# Patient Record
Sex: MALE | Race: WHITE | Employment: OTHER | URBAN - METROPOLITAN AREA
[De-identification: names, ages, dates, MRNs, and addresses within clinical notes are randomized per-mention and may not be internally consistent; named-entity substitution may affect disease eponyms.]

---

## 2020-10-22 ENCOUNTER — TELEPHONE (OUTPATIENT)
Dept: CARDIOLOGY | Facility: CLINIC | Age: 60
End: 2020-10-22

## 2020-10-22 ENCOUNTER — TRANSFERRED RECORDS (OUTPATIENT)
Dept: HEALTH INFORMATION MANAGEMENT | Facility: CLINIC | Age: 60
End: 2020-10-22

## 2020-10-22 NOTE — TELEPHONE ENCOUNTER
[facility] Call Center    Phone Message    May a detailed message be left on voicemail: yes     Reason for Call: Patient called to schedule with cardiologist for A-Fib. He has referral from his primary physician in hand. Scheduled him for 1/4/2021 with Dr. Mendez and put on wait list. Patient is hoping he can get in sooner so he doesn't need to go to ED as he is experiencing tachycardia that keeps him up at night. Patient states he lives very close and can come whenever we can get him in. Please advise. Thank you.    Action Taken: Message routed to:  Adult Clinics: Cardiology p [id number]    Travel Screening: Not Applicable

## 2020-10-23 ENCOUNTER — MEDICAL CORRESPONDENCE (OUTPATIENT)
Dept: HEALTH INFORMATION MANAGEMENT | Facility: CLINIC | Age: 60
End: 2020-10-23

## 2020-10-23 NOTE — TELEPHONE ENCOUNTER
[facility] Call Center    Phone Message    May a detailed message be left on voicemail: yes     Reason for Call: Patient returned call. Please call back. Thank you.    Action Taken: Message routed to:  Adult Clinics: Cardiology p [id number]    Travel Screening: Not Applicable

## 2020-10-23 NOTE — TELEPHONE ENCOUNTER
Called and left message for patient. Asked him to call back regarding records. Need to know when records could be received , nothing in Care Everywhere.     DBalcsam, RN

## 2020-10-26 ENCOUNTER — TELEPHONE (OUTPATIENT)
Dept: CARDIOLOGY | Facility: CLINIC | Age: 60
End: 2020-10-26

## 2020-10-26 ENCOUNTER — MEDICAL CORRESPONDENCE (OUTPATIENT)
Dept: HEALTH INFORMATION MANAGEMENT | Facility: CLINIC | Age: 60
End: 2020-10-26

## 2020-10-26 NOTE — TELEPHONE ENCOUNTER
Patient called back. He PCP , Dr Vargas is referring to the [facility] for afib episodes. He had an ablation about 5 years ago at [facility] and didn't have any subsequent episodes after until recently. He is now having episodes daily, usually when he sits or tries to sleep. They keep him up at night. He is very tired and weak and not sleeping well at all. He is on Xarelto 20 mg daily.   Unable to access records from Care Everywhere. He will come and sign a release for Care Everywhere. He is hoping for an earlier appt than what he has scheduled. Will review records and call him back.   TANNA David

## 2020-10-27 ENCOUNTER — TRANSCRIBE ORDERS (OUTPATIENT)
Dept: OTHER | Age: 60
End: 2020-10-27

## 2020-10-27 DIAGNOSIS — I48.91 ATRIAL FIBRILLATION WITH RVR (H): Primary | ICD-10-CM

## 2020-10-28 NOTE — TELEPHONE ENCOUNTER
Records received from referral specialist and reviewed. Patient was seen on 10/22/20 by his PCP, Dr Vargas. He c/o recurrent palpitations, with pulse up to 150-220 bpm. He has been able to bring his pulse down in the past but lately is is becoming more difficult to do this. He had a right atrial flutter ablation at [facility] by Dr Reilly on  4/5/16.He does not want to return to [facility] for cardiology follow up.     Consulted with Dr Del Valle. Recommended he see Dr Wetzel at the [facility]. Will have referral records scanned into the chart. Patient will call scheduling at the  ([facility]) and arrange an appt.    TANNA David

## 2020-10-29 NOTE — TELEPHONE ENCOUNTER
RECORDS RECEIVED FROM:   DATE RECEIVED:   NOTES STATUS DETAILS   OFFICE NOTE from referring provider    Received 10-23-20 from [facility]   OFFICE NOTE from other cardiologist    N/A    DISCHARGE SUMMARY from hospital    N/A    DISCHARGE REPORT from the ER   N/A    OPERATIVE REPORT    N/A    MEDICATION LIST   N/A    LABS     BMP   N/A    CBC   N/A    CMP   N/A    Lipids   N/A    TSH   N/A    DIAGNOSTIC PROCEDURES     EKG   In process 10-22-20   Monitor Reports   N/A    IMAGING (DISC & REPORT)      Echo   N/A    Stress Tests   N/A    Cath   N/A    MRI/MRA   N/A    CT/CTA   In process 11-19-19     Action    Action Taken 10-29: requested CT chest 11-19-19 from [facility]  10-29: requested [facility] resend EKG from 10-22-20 to see if we can get a better quality one  11-4: Received EKG, not better quality. Resolved CT Chest from [facility].

## 2020-11-03 ENCOUNTER — DOCUMENTATION ONLY (OUTPATIENT)
Dept: CARE COORDINATION | Facility: CLINIC | Age: 60
End: 2020-11-03

## 2020-11-06 NOTE — PROGRESS NOTES
"Darryn Guajardo is a 60 year old male who is being evaluated via a billable video visit.      The patient has been notified of following:     \"This video visit will be conducted via a call between you and your physician/provider. We have found that certain health care needs can be provided without the need for an in-person physical exam.  This service lets us provide the care you need with a video conversation.  If a prescription is necessary we can send it directly to your pharmacy.  If lab work is needed we can place an order for that and you can then stop by our lab to have the test done at a later time.    Video visits are billed at different rates depending on your insurance coverage.  Please reach out to your insurance provider with any questions.    If during the course of the call the physician/provider feels a video visit is not appropriate, you will not be charged for this service.\"    Patient has given verbal consent for Video visit? Yes  How would you like to obtain your AVS? [employer]  If you are dropped from the video visit, the video invite should be resent to: Text to cell phone: [phone]   Will anyone else be joining your video visit? No         Video-Visit Details    Type of service:  Video Visit    Video Start Time: 9:02 AM  Video End Time: 10:01 AM    Originating Location (pt. Location): Home    Distant Location (provider location):  [facility]     Platform used for Video Visit: Sanna Wetzel MD    Pt will be connecting on [employer] @8:45AM      HPI:  Mr. Guajardo is 61 yo Male with a PMH of AF/AFL s/p DCCV x 2, CTI ABL at [facility] by Dr Reilly on 4/5/2016, PSVT in 2016, and positive stress 2016 --> negative CORS.  He was referred to the [facility] by Dr. Vargas for frequent PAF.  He had done well since the AFL ABL until 8/2020 when he started having palpitation episodes. He was found to have AF at Dr. Vargas's office.  He is now having AF lasting for " several hours every day.  He stated that stress and fatigue might be a trigger of AF.  He denied chest pian, SOB or dizziness.  He is now Xarelto 20 mg daily.      PAST MEDICAL HISTORY:  Past Medical History:   Diagnosis Date     Lyme disease         CURRENT MEDICATIONS:  Current Outpatient Medications   Medication Sig Dispense Refill     cetirizine (ZYRTEC) 10 MG tablet 1 tablet as needed       rivaroxaban ANTICOAGULANT (XARELTO) 20 MG TABS tablet 1 tablet with food         PAST SURGICAL HISTORY:  Past Surgical History:   Procedure Laterality Date     HERNIA REPAIR         ALLERGIES:     Allergies   Allergen Reactions     Beer Unknown     Ciprofloxacin Unknown     Penicillin G        FAMILY HISTORY:  - Premature coronary artery disease  + Atrial fibrillation  - Sudden cardiac death   Father: mitral valve replacement.    SOCIAL HISTORY:  Social History     Tobacco Use     Smoking status: Former Smoker     Types: Cigarettes     Smokeless tobacco: Never Used   Substance Use Topics     Alcohol use: Yes     Comment: Social     Drug use: Never       ROS:   12 points of ROS were reviewed.  Constitutional: No fever, chills, or sweats. Weight stable.   ENT: No visual disturbance, ear ache, epistaxis, sore throat.   Cardiovascular: As per HPI.   Respiratory: No cough, hemoptysis.    GI: No nausea, vomiting, hematemesis, melena, or hematochezia.   : No hematuria.   Integument: Negative.   Psychiatric: Negative.   Hematologic:  Easy bruising, no easy bleeding.  Neuro: Negative.   Endocrinology: No significant heat or cold intolerance   Musculoskeletal: No myalgia.    Exam:  There were no vitals taken for this visit.  GENERAL APPEARANCE: healthy, alert and no distress  HEENT: no icterus, no xanthelasmas, normal pupil size and reaction, normal palate, mucosa moist, no central cyanosis  NECK: no adenopathy, no asymmetry, masses, or scars, thyroid normal to palpation and no bruits, JVP not elevated  RESPIRATORY: lungs clear to  auscultation - no rales, rhonchi or wheezes, no use of accessory muscles, no retractions, respirations are unlabored, normal respiratory rate  CARDIOVASCULAR: regular rhythm, normal S1 with physiologic split S2, no S3 or S4 and no murmur, click or rub, precordium quiet with normal PMI.  ABDOMEN: soft, non tender, without hepatosplenomegaly, no masses palpable, bowel sounds normal, aorta not enlarged by palpation, no abdominal bruits  EXTREMITIES: peripheral pulses normal, no edema, no bruits  NEURO: alert and oriented to person/place/time, normal speech, gait and affect  VASC: Radial, femoral, dorsalis pedis and posterior tibialis pulses are normal in volumes and symmetric bilaterally. No bruits are heard.  SKIN: no ecchymoses, no rashes    Labs:  CBC RESULTS:   No results found for: WBC, RBC, HGB, HCT, MCV, MCH, MCHC, RDW, PLT    BMP RESULTS:  No results found for: NA, POTASSIUM, CHLORIDE, CO2, ANIONGAP, GLC, BUN, CR, GFRESTIMATED, GFRESTBLACK, WANDA     INR RESULTS:  No results found for: INR    Procedures:      Assessment and Plan:  #1 AFL s/p DCCV x 2, CTI ABL at [facility] by Dr Reilly on 4/5/2016  #2 PSVT in 2016  #3 Positive stress 2016 --> negative CORS.  #4 PAF  #5 CHADS2-Vasc score = 0.  We discussed the nature and treatment options of AF. I spent more than 30 min to do that.  I will place him on a Margoth patch for a week to see an AF burden and order a nuclear stress test and TTE.  I will see him back in a couple of weeks and make a treatment plan by looking at those results.    CC  Patient Care Team:  Judie Vidal, RN as Specialty Care Coordinator (Cardiology)  Jose Wetzel MD (Cardiovascular Disease)  JASMYNE MARTÍNEZ

## 2020-11-09 ENCOUNTER — PRE VISIT (OUTPATIENT)
Dept: CARDIOLOGY | Facility: CLINIC | Age: 60
End: 2020-11-09

## 2020-11-09 ENCOUNTER — VIRTUAL VISIT (OUTPATIENT)
Dept: CARDIOLOGY | Facility: CLINIC | Age: 60
End: 2020-11-09
Attending: INTERNAL MEDICINE
Payer: COMMERCIAL

## 2020-11-09 DIAGNOSIS — I48.0 PAROXYSMAL ATRIAL FIBRILLATION (H): Primary | ICD-10-CM

## 2020-11-09 PROCEDURE — 99205 OFFICE O/P NEW HI 60 MIN: CPT | Mod: GT | Performed by: INTERNAL MEDICINE

## 2020-11-09 RX ORDER — CETIRIZINE HYDROCHLORIDE 10 MG/1
TABLET ORAL
COMMUNITY

## 2020-11-09 NOTE — LETTER
"11/9/2020      RE: Darryn Guajardo  [address]  [place] [address]       Dear Colleague,    Thank you for the opportunity to participate in the care of your patient, Darryn Guajardo, at the [facility]. Please see a copy of my visit note below.    Darryn Guajardo is a 60 year old male who is being evaluated via a billable video visit.      The patient has been notified of following:     \"This video visit will be conducted via a call between you and your physician/provider. We have found that certain health care needs can be provided without the need for an in-person physical exam.  This service lets us provide the care you need with a video conversation.  If a prescription is necessary we can send it directly to your pharmacy.  If lab work is needed we can place an order for that and you can then stop by our lab to have the test done at a later time.    Video visits are billed at different rates depending on your insurance coverage.  Please reach out to your insurance provider with any questions.    If during the course of the call the physician/provider feels a video visit is not appropriate, you will not be charged for this service.\"    Patient has given verbal consent for Video visit? Yes  How would you like to obtain your AVS? [employer]  If you are dropped from the video visit, the video invite should be resent to: Text to cell phone: [phone]   Will anyone else be joining your video visit? No         Video-Visit Details    Type of service:  Video Visit    Video Start Time: 9:02 AM  Video End Time: 10:01 AM    Originating Location (pt. Location): Home    Distant Location (provider location):  [facility]     Platform used for Video Visit: Sanna Wetzel MD    Pt will be connecting on [employer] @8:45AM      HPI:  Mr. Guajardo is 59 yo Male with a PMH of AF/AFL s/p DCCV x 2, CTI ABL " at [facility] by Dr Reilly on 4/5/2016, PSVT in 2016, and positive stress 2016 --> negative CORS.  He was referred to the [facility] by Dr. Vargas for frequent PAF.  He had done well since the AFL ABL until 8/2020 when he started having palpitation episodes. He was found to have AF at Dr. Vargas's office.  He is now having AF lasting for several hours every day.  He stated that stress and fatigue might be a trigger of AF.  He denied chest pian, SOB or dizziness.  He is now Xarelto 20 mg daily.      PAST MEDICAL HISTORY:  Past Medical History:   Diagnosis Date     Lyme disease         CURRENT MEDICATIONS:  Current Outpatient Medications   Medication Sig Dispense Refill     cetirizine (ZYRTEC) 10 MG tablet 1 tablet as needed       rivaroxaban ANTICOAGULANT (XARELTO) 20 MG TABS tablet 1 tablet with food         PAST SURGICAL HISTORY:  Past Surgical History:   Procedure Laterality Date     HERNIA REPAIR         ALLERGIES:     Allergies   Allergen Reactions     Beer Unknown     Ciprofloxacin Unknown     Penicillin G        FAMILY HISTORY:  - Premature coronary artery disease  + Atrial fibrillation  - Sudden cardiac death   Father: mitral valve replacement.    SOCIAL HISTORY:  Social History     Tobacco Use     Smoking status: Former Smoker     Types: Cigarettes     Smokeless tobacco: Never Used   Substance Use Topics     Alcohol use: Yes     Comment: Social     Drug use: Never       ROS:   12 points of ROS were reviewed.  Constitutional: No fever, chills, or sweats. Weight stable.   ENT: No visual disturbance, ear ache, epistaxis, sore throat.   Cardiovascular: As per HPI.   Respiratory: No cough, hemoptysis.    GI: No nausea, vomiting, hematemesis, melena, or hematochezia.   : No hematuria.   Integument: Negative.   Psychiatric: Negative.   Hematologic:  Easy bruising, no easy bleeding.  Neuro: Negative.   Endocrinology: No significant heat or cold intolerance   Musculoskeletal: No myalgia.    Exam:  There were no  vitals taken for this visit.  GENERAL APPEARANCE: healthy, alert and no distress  HEENT: no icterus, no xanthelasmas, normal pupil size and reaction, normal palate, mucosa moist, no central cyanosis  NECK: no adenopathy, no asymmetry, masses, or scars, thyroid normal to palpation and no bruits, JVP not elevated  RESPIRATORY: lungs clear to auscultation - no rales, rhonchi or wheezes, no use of accessory muscles, no retractions, respirations are unlabored, normal respiratory rate  CARDIOVASCULAR: regular rhythm, normal S1 with physiologic split S2, no S3 or S4 and no murmur, click or rub, precordium quiet with normal PMI.  ABDOMEN: soft, non tender, without hepatosplenomegaly, no masses palpable, bowel sounds normal, aorta not enlarged by palpation, no abdominal bruits  EXTREMITIES: peripheral pulses normal, no edema, no bruits  NEURO: alert and oriented to person/place/time, normal speech, gait and affect  VASC: Radial, femoral, dorsalis pedis and posterior tibialis pulses are normal in volumes and symmetric bilaterally. No bruits are heard.  SKIN: no ecchymoses, no rashes    Labs:  CBC RESULTS:   No results found for: WBC, RBC, HGB, HCT, MCV, MCH, MCHC, RDW, PLT    BMP RESULTS:  No results found for: NA, POTASSIUM, CHLORIDE, CO2, ANIONGAP, GLC, BUN, CR, GFRESTIMATED, GFRESTBLACK, WANDA     INR RESULTS:  No results found for: INR    Procedures:      Assessment and Plan:  #1 AFL s/p DCCV x 2, CTI ABL at [facility] by Dr Reilly on 4/5/2016  #2 PSVT in 2016  #3 Positive stress 2016 --> negative CORS.  #4 PAF  #5 CHADS2-Vasc score = 0.  We discussed the nature and treatment options of AF. I spent more than 30 min to do that.  I will place him on a Margoth patch for a week to see an AF burden and order a nuclear stress test and TTE.  I will see him back in a couple of weeks and make a treatment plan by looking at those results.    CC  Patient Care Team:  Judie Vidal, RN as Specialty Care Coordinator  (Cardiology)  Jose Wetzel MD (Cardiovascular Disease)  JASMYNE MARTÍNEZ        Please do not hesitate to contact me if you have any questions/concerns.     Sincerely,     Jose Wetzel MD

## 2020-11-09 NOTE — PATIENT INSTRUCTIONS
You were seen virtually in Electrophysiology today by: Dr Wetzel    Plan:     Labs/Tests Needed:    Echocardiogram    Exercise nuclear med stress test    7 day ziopatch upon completion of above tests    Follow up visit: upon completion of above tests ~ 6 weeks      Your Care Team:  [facility] Cardiology   Telephone Number     Judie Vidal RN [phone]     For scheduling appts or procedures:    Lakshmi Joshua   [phone]   For the Device Clinic (Pacemakers, ICDs, Loop Recorders)    During business hours: [phone]  After business hours:   [phone]- select option 4 and ask for job code [id number].       Cardiovascular Clinic:   [address] [place] [address]. [place] [address]      As always, Thank you for trusting us with your health care needs!

## 2020-11-13 ENCOUNTER — ALLIED HEALTH/NURSE VISIT (OUTPATIENT)
Dept: CARDIOLOGY | Facility: CLINIC | Age: 60
End: 2020-11-13
Attending: INTERNAL MEDICINE
Payer: COMMERCIAL

## 2020-11-13 DIAGNOSIS — I48.0 PAROXYSMAL ATRIAL FIBRILLATION (H): ICD-10-CM

## 2021-01-15 ENCOUNTER — HEALTH MAINTENANCE LETTER (OUTPATIENT)
Age: 61
End: 2021-01-15

## 2021-09-04 ENCOUNTER — HEALTH MAINTENANCE LETTER (OUTPATIENT)
Age: 61
End: 2021-09-04

## 2022-02-19 ENCOUNTER — HEALTH MAINTENANCE LETTER (OUTPATIENT)
Age: 62
End: 2022-02-19

## 2022-10-22 ENCOUNTER — HEALTH MAINTENANCE LETTER (OUTPATIENT)
Age: 62
End: 2022-10-22

## 2023-04-01 ENCOUNTER — HEALTH MAINTENANCE LETTER (OUTPATIENT)
Age: 63
End: 2023-04-01